# Patient Record
Sex: MALE | Race: WHITE | NOT HISPANIC OR LATINO | Employment: STUDENT | URBAN - METROPOLITAN AREA
[De-identification: names, ages, dates, MRNs, and addresses within clinical notes are randomized per-mention and may not be internally consistent; named-entity substitution may affect disease eponyms.]

---
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
## 2022-06-21 NOTE — TELEPHONE ENCOUNTER
----- Message from Hayden Larsen A.P.RRosemarieNRosemarie sent at 6/17/2021  3:16 PM PDT -----  Please call pt and give lab results: TB test is negative.  
Phone Number Called: [phone] (home)     Call outcome: Spoke to Mother Shanda Rowley    Message: Informed Auth Party Mother Shandadaxa Stewards of results below.  Mother understood, no questions at this time.  
show